# Patient Record
Sex: FEMALE | URBAN - METROPOLITAN AREA
[De-identification: names, ages, dates, MRNs, and addresses within clinical notes are randomized per-mention and may not be internally consistent; named-entity substitution may affect disease eponyms.]

---
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
## 2022-04-10 ENCOUNTER — HISTORICAL (OUTPATIENT)
Dept: ADMINISTRATIVE | Facility: HOSPITAL | Age: 22
End: 2022-04-10

## 2022-04-26 VITALS
BODY MASS INDEX: 19.47 KG/M2 | HEIGHT: 59 IN | SYSTOLIC BLOOD PRESSURE: 107 MMHG | DIASTOLIC BLOOD PRESSURE: 69 MMHG | WEIGHT: 96.56 LBS

## 2022-05-03 NOTE — HISTORICAL [facility] CERNER
This is a historical note converted from [facility]. Formatting and pictures may have been removed.  Please reference [facility]ravan for original formatting and attached multimedia. Chief Complaint  new pt in for oral contraception  History of Present Illness  20-year-old  female reports to clinic to establish care today. ?She has no initial complaints today. ?Nurse reports that she had an elevated score and depression screen.  Cant sleep well. No appetite. Little interest in doing things anymore. No SI/HI.??Upon further questioning, patient states that she punched a wall?yesterday and her?right hand is swollen with overlying abrasions over her fourth and fifth knuckle.  ?  pmh: none  Meds: none  all: nkda  psh: none  sh: no etoh, smoking, or illicit drug use. sexually active with boyfriend only and does not use protection. not currently employed  menstrual history: regular, last 5 days, no issues. BC history- [facility] years ago with no problems.  fh: none reported  Review of Systems  General: No fever, no chills, + fatigue  CVS: No chest pain, No palpitations, no edema  Resp: No SOB, no cough, no difficulty breathing  ENT:? No vision changes  Musculoskeletal: No weakness, no change in gait  :? No dysuria, no hematuria  GI: No nausea, vomiting, or diarrhea  Neuro: No syncope, no dizziness, no paresthesias  Psych:?see hpi  Physical Exam  Vitals & Measurements  T:?37.1? ?C (Oral)? HR:?71(Peripheral)? RR:?20? BP:?107/69?  HT:?149.86?cm? WT:?43.800?kg? BMI:?19.5? LMP:?2020 00:00 CDT?  General:? well developed; NAD  HEENT:? oral mucosa moist,? EOMI  CV:? RRR, no murmurs, no rubs, no gallops  Resp:? non-labored breathing, symmetrical chest expansion bilaterally, no wheezing, no crackles, no stridor  Abd:? soft, non-distended, no TTP, +BS, no organomegaly  MSK:?Right hand with swelling around the?MCP of the fourth and fifth digit?with overlying abrasion, tenderness palpation, positive ecchymosis  Skin:? warm, dry, pink, no  rashes or sores  Neuro:? AAOx4, no focal deficits, no weakness, normal sensory  Psych:?Flat affect,?low mood  ?  ?  Radiology Report  XR Hand Right Minimum 3 Views  ?  REASON FOR EXAM: punched wall  ?  COMPARISON: No relevant comparison studies available at the time of  dictation.  ?  FINDINGS: No fracture identified. Joint alignments are maintained.  Normal bone mineralization.  ?  IMPRESSION: No acute osseous process appreciated.  ?  Assessment/Plan  1.?Establishing care with new doctor, encounter for?Z76.89  Ordered:  Clinic Follow up, *Est. 09/14/20 3:00:00 CDT, Order for future visit, Establishing care with new doctor, encounter for  Contraception management  Immunization due  Depression  Hand pain, right, [facility]  ?  2.?Contraception management?Z30.9  Urine pregnancy test negative today  Discussed alternative?contraceptive methods and patient states she she would like the?Depo injection.  will give depo provera today  ?  Ordered:  Clinic Follow up, *Est. 09/14/20 3:00:00 CDT, Order for future visit, Establishing care with new doctor, encounter for  Contraception management  Immunization due  Depression  Hand pain, right, [facility]  ?  3.?Immunization due?Z23  ?hep A and men B? and?Men?A today  Ordered:  Clinic Follow up, *Est. 09/14/20 3:00:00 CDT, Order for future visit, Establishing care with new doctor, encounter for  Contraception management  Immunization due  Depression  Hand pain, right, [facility]  ?  4.?Depression?F32.9  ?Offered patient?referral to a counselor, but she states she?has counseling sessions with?a family member who experienced depression in the past.  Offered patient medications at this time to get her through this tough time and she was?amenable to starting Zoloft 50 mg daily.? Side effects discussed with patient?including?increased risk for suicidal ideation.? SI/HI precautions given that patient should?report to the  ED.  Ordered:  CBC w/ Auto Diff, Routine collect, *Est. 08/17/20 3:00:00 CDT, Blood, Order for future visit, *Est. Stop date 08/17/20 3:00:00 CDT, Lab Collect, Depression, 08/17/20 11:38:00 CDT  Clinic Follow up, *Est. 09/14/20 3:00:00 CDT, Order for future visit, Establishing care with new doctor, encounter for  Contraception management  Immunization due  Depression  Hand pain, right, [facility]  Thyroid Stimulating Hormone, Routine collect, *Est. 08/17/20 3:00:00 CDT, Blood, Order for future visit, *Est. Stop date 08/17/20 3:00:00 CDT, Lab Collect, Depression, 08/17/20 11:38:00 CDT  ?  5.?Hand pain, right?M79.641  ?X-rays reviewed and no?occult fracture seen.? Mild soft tissue swelling noted.? Will?put patient in a soft splint and follow in?2 to 4 weeks.  Ordered:  Clinic Follow up, *Est. 09/14/20 3:00:00 CDT, Order for future visit, Establishing care with new doctor, encounter for  Contraception management  Immunization due  Depression  Hand pain, right, [facility]  XR Hand Right Minimum 3 Views, Stat, 08/17/20 11:15:00 CDT, punched wall, None, Ambulatory, Rad Type, Hand pain, right, Not Scheduled, 08/17/20 11:15:00 CDT  ?  RTC in 3-4 weeks  Referrals  Clinic Follow up, *Est. 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 Exercise frequency: 3-4 times/week. Exercise type: Walking, Running.,  08/17/2020  Home/Environment  Lives with Children, Significant other, mother in law and father in law. Living situation: Home/Independent., 08/17/2020  Nutrition/Health  Good, 08/17/2020  Sexual  Sexually active: Yes. Number of current partners 1. Sexual orientation: Straight or heterosexual. Gender Identity Identifies as female., 08/17/2020  Substance Use  Never, 08/17/2020  Tobacco  Never (less than 100 in lifetime), N/A, 08/17/2020  Family History  Family history is negative  Immunizations  Vaccine Date Status   hepatitis A pediatric vaccine 08/17/2020 Given   meningococcal group B vaccine 08/17/2020 Given   meningococcal conjugate vaccine 08/17/2020 Given   Health Maintenance  Health Maintenance  ???Pending?(in the next year)  ??? ??OverDue  ??? ? ? ?Alcohol Misuse Screening due??01/02/20??and every 1??year(s)  ??? ??Due?  ??? ? ? ?Influenza Vaccine due??08/17/20??and every?  ??? ??Due In Future?  ??? ? ? ?Obesity Screening not due until??01/01/21??and every 1??year(s)  ???Satisfied?(in the past 1 year)  ??? ??Satisfied?  ??? ? ? ?ADL Screening on??08/17/20.??Satisfied by [facility]  ??? ? ? ?Blood Pressure Screening on??08/17/20.??Satisfied by [facility]  ??? ? ? ?Body Mass Index Check on??08/17/20.??Satisfied by [facility]  ??? ? ? ?Depression Screening on??08/17/20.??Satisfied by [facility]  ??? ? ? ?Obesity Screening on??08/17/20.??Satisfied by [facility]  ?      ????I discussed with the resident and agree with the residents findings and plan as documented in the residents note.

## 2022-09-18 ENCOUNTER — HISTORICAL (OUTPATIENT)
Dept: ADMINISTRATIVE | Facility: HOSPITAL | Age: 22
End: 2022-09-18